# Patient Record
Sex: MALE | Race: WHITE
[De-identification: names, ages, dates, MRNs, and addresses within clinical notes are randomized per-mention and may not be internally consistent; named-entity substitution may affect disease eponyms.]

---

## 2017-04-19 ENCOUNTER — HOSPITAL ENCOUNTER (OUTPATIENT)
Dept: HOSPITAL 19 - COL.RAD | Age: 47
End: 2017-04-19
Attending: UROLOGY
Payer: COMMERCIAL

## 2017-04-19 DIAGNOSIS — N20.0: Primary | ICD-10-CM

## 2017-04-19 DIAGNOSIS — N28.89: ICD-10-CM

## 2020-02-10 ENCOUNTER — HOSPITAL ENCOUNTER (OUTPATIENT)
Dept: HOSPITAL 19 - EUO | Age: 50
Discharge: HOME | End: 2020-02-10
Payer: COMMERCIAL

## 2020-02-10 VITALS — HEART RATE: 88 BPM | DIASTOLIC BLOOD PRESSURE: 72 MMHG | TEMPERATURE: 97.3 F | SYSTOLIC BLOOD PRESSURE: 148 MMHG

## 2020-02-10 VITALS — WEIGHT: 188.5 LBS | BODY MASS INDEX: 27.92 KG/M2 | HEIGHT: 69.02 IN

## 2020-02-10 DIAGNOSIS — L40.50: Primary | ICD-10-CM

## 2020-04-16 ENCOUNTER — HOSPITAL ENCOUNTER (OUTPATIENT)
Dept: HOSPITAL 19 - EUO | Age: 50
Discharge: HOME | End: 2020-04-16
Attending: FAMILY MEDICINE
Payer: COMMERCIAL

## 2020-04-16 VITALS — DIASTOLIC BLOOD PRESSURE: 102 MMHG | TEMPERATURE: 98.7 F | HEART RATE: 70 BPM | SYSTOLIC BLOOD PRESSURE: 132 MMHG

## 2020-04-16 VITALS — WEIGHT: 185.19 LBS | BODY MASS INDEX: 27.43 KG/M2 | HEIGHT: 69.02 IN

## 2020-04-16 DIAGNOSIS — Z79.2: ICD-10-CM

## 2020-04-16 DIAGNOSIS — B95.61: ICD-10-CM

## 2020-04-16 DIAGNOSIS — M00.042: Primary | ICD-10-CM

## 2020-04-16 NOTE — NUR
Pt arrived for evaluation of PICC.Per nurse pt reports PICC not flushing.Pt
arrives with extension on end of PICC line,no PICC cap in place.Extension
removed,PICC cap apllied and PICC flushes with blood return observed.Pt
discharge after apt.

## 2020-04-16 NOTE — NUR
Here for PICC evaluation. According to the patient, he was unable to flush
PICC today. PICC intact right upper arm with no cap on extension set.
Extension set removed. cap applied to end of PICC and flushed with 20ml normal
saline without difficulty with blood return noted. new extension set applied.
cap applied to end of extension set and flushed with 20ml normal saline
without difficulty or resistance. noted catheter out to at least 10cm. patient
reported it has migrated out with dressing changes. his PICC cares are being
provided at [facility]. They are aware of insertion site. will defer to them.
patient to ask about this with his appointment on Monday. voiced understanding
of instructions.

## 2021-10-22 ENCOUNTER — HOSPITAL ENCOUNTER (INPATIENT)
Dept: HOSPITAL 19 - COL.ER | Age: 51
LOS: 4 days | Discharge: HOME | DRG: 177 | End: 2021-10-26
Attending: HOSPITALIST | Admitting: HOSPITALIST
Payer: COMMERCIAL

## 2021-10-22 VITALS — HEIGHT: 69.02 IN | WEIGHT: 189.6 LBS | BODY MASS INDEX: 28.08 KG/M2

## 2021-10-22 VITALS — SYSTOLIC BLOOD PRESSURE: 124 MMHG | TEMPERATURE: 97.9 F | HEART RATE: 85 BPM | DIASTOLIC BLOOD PRESSURE: 86 MMHG

## 2021-10-22 VITALS — DIASTOLIC BLOOD PRESSURE: 95 MMHG | HEART RATE: 94 BPM | SYSTOLIC BLOOD PRESSURE: 121 MMHG | TEMPERATURE: 98.3 F

## 2021-10-22 DIAGNOSIS — F32.A: ICD-10-CM

## 2021-10-22 DIAGNOSIS — J96.01: ICD-10-CM

## 2021-10-22 DIAGNOSIS — F41.9: ICD-10-CM

## 2021-10-22 DIAGNOSIS — N17.9: ICD-10-CM

## 2021-10-22 DIAGNOSIS — M51.36: ICD-10-CM

## 2021-10-22 DIAGNOSIS — L40.50: ICD-10-CM

## 2021-10-22 DIAGNOSIS — G56.02: ICD-10-CM

## 2021-10-22 DIAGNOSIS — T50.995A: ICD-10-CM

## 2021-10-22 DIAGNOSIS — Z73.0: ICD-10-CM

## 2021-10-22 DIAGNOSIS — M50.30: ICD-10-CM

## 2021-10-22 DIAGNOSIS — I12.9: ICD-10-CM

## 2021-10-22 DIAGNOSIS — G89.29: ICD-10-CM

## 2021-10-22 DIAGNOSIS — E83.52: ICD-10-CM

## 2021-10-22 DIAGNOSIS — D64.9: ICD-10-CM

## 2021-10-22 DIAGNOSIS — U07.1: Primary | ICD-10-CM

## 2021-10-22 DIAGNOSIS — N18.9: ICD-10-CM

## 2021-10-22 DIAGNOSIS — Z96.651: ICD-10-CM

## 2021-10-22 DIAGNOSIS — Z79.52: ICD-10-CM

## 2021-10-22 DIAGNOSIS — R04.2: ICD-10-CM

## 2021-10-22 DIAGNOSIS — J12.82: ICD-10-CM

## 2021-10-22 DIAGNOSIS — D84.821: ICD-10-CM

## 2021-10-22 DIAGNOSIS — I21.A1: ICD-10-CM

## 2021-10-22 LAB
ALBUMIN SERPL-MCNC: 3 GM/DL (ref 3.5–5)
ALP SERPL-CCNC: 58 U/L (ref 40–150)
ALT SERPL-CCNC: 23 U/L (ref 0–55)
ANION GAP SERPL CALC-SCNC: 10 MMOL/L (ref 7–16)
AST SERPL-CCNC: 21 U/L (ref 5–34)
BASE EXCESS BLDA CALC-SCNC: -1.1 MMOL/L (ref -2–2)
BASOPHILS # BLD: 0 K/MM3 (ref 0–0.2)
BASOPHILS NFR BLD AUTO: 0.3 % (ref 0–2)
BILIRUB SERPL-MCNC: 0.3 MG/DL (ref 0.2–1.2)
BUN SERPL-MCNC: 32 MG/DL (ref 9–21)
CALCIUM SERPL-MCNC: 9.6 MG/DL (ref 8.4–10.2)
CHLORIDE SERPL-SCNC: 100 MMOL/L (ref 98–107)
CO2 BLDA-SCNC: 23.7 MMOL/L
CO2 SERPL-SCNC: 27 MMOL/L (ref 22–29)
CREAT SERPL-SCNC: 1.12 MG/DL (ref 0.72–1.25)
CRP SERPL-MCNC: 26.64 MG/DL (ref 0–0.5)
EOSINOPHIL # BLD: 0 K/MM3 (ref 0–0.7)
EOSINOPHIL NFR BLD: 0.1 % (ref 0–4)
ERYTHROCYTE [DISTWIDTH] IN BLOOD BY AUTOMATED COUNT: 16.2 % (ref 11.5–14.5)
GLUCOSE SERPL-MCNC: 72 MG/DL (ref 70–99)
GRANULOCYTES # BLD AUTO: 87.7 % (ref 42.2–75.2)
HCO3 BLDA-SCNC: 22.6 MEQ/L (ref 22–26)
HCT VFR BLD AUTO: 31.5 % (ref 42–52)
HGB BLD-MCNC: 10.3 G/DL (ref 13.5–18)
INR BLD: 1.2 (ref 0.8–3)
LYMPHOCYTES # BLD: 0.5 K/MM3 (ref 1.2–3.4)
LYMPHOCYTES NFR BLD: 8 % (ref 20–51)
MAGNESIUM SERPL-MCNC: 1.9 MG/DL (ref 1.6–2.6)
MCH RBC QN AUTO: 30 PG (ref 27–31)
MCHC RBC AUTO-ENTMCNC: 33 G/DL (ref 33–37)
MCV RBC AUTO: 92 FL (ref 80–100)
MONOCYTES # BLD: 0.2 K/MM3 (ref 0.1–0.6)
MONOCYTES NFR BLD AUTO: 3.2 % (ref 1.7–9.3)
NEUTROPHILS # BLD: 5.9 K/MM3 (ref 1.4–6.5)
PCO2 BLDA: 34.2 MMHG (ref 35–45)
PLATELET # BLD AUTO: 215 K/MM3 (ref 130–400)
PMV BLD AUTO: 8.3 FL (ref 7.4–10.4)
PO2 BLDA: 67.4 MMHG (ref 80–100)
POTASSIUM SERPL-SCNC: 4.3 MMOL/L (ref 3.5–4.5)
PROT SERPL-MCNC: 6.5 GM/DL (ref 6.2–8.1)
PROTHROMBIN TIME: 13.3 SECONDS (ref 9.7–12.8)
RBC # BLD AUTO: 3.41 M/MM3 (ref 4.2–5.6)
SAO2 % BLDA: 93 % (ref 92–100)
SODIUM SERPL-SCNC: 137 MMOL/L (ref 136–145)
TROPONIN I SERPL-MCNC: 0.08 NG/ML (ref 0–0.03)
URN COLLECT METHOD CLASS: (no result)

## 2021-10-22 PROCEDURE — XW033E5 INTRODUCTION OF REMDESIVIR ANTI-INFECTIVE INTO PERIPHERAL VEIN, PERCUTANEOUS APPROACH, NEW TECHNOLOGY GROUP 5: ICD-10-PCS | Performed by: HOSPITALIST

## 2021-10-22 NOTE — NUR
Call placed to Sarah Lobo re: critical troponin level, no new orders at
this time. Continue to monitor.

## 2021-10-22 NOTE — NUR
Arrived to unit alert, oriented x 4, able to verbalize needs, shortness of
breath with ambulation, O2@ 8L per oxymask, telemetry in use, no c/o at this
time,

## 2021-10-23 VITALS — SYSTOLIC BLOOD PRESSURE: 128 MMHG | TEMPERATURE: 98.4 F | HEART RATE: 81 BPM | DIASTOLIC BLOOD PRESSURE: 91 MMHG

## 2021-10-23 VITALS — HEART RATE: 73 BPM | TEMPERATURE: 97.6 F | DIASTOLIC BLOOD PRESSURE: 81 MMHG | SYSTOLIC BLOOD PRESSURE: 116 MMHG

## 2021-10-23 VITALS — DIASTOLIC BLOOD PRESSURE: 68 MMHG | SYSTOLIC BLOOD PRESSURE: 129 MMHG | HEART RATE: 76 BPM | TEMPERATURE: 97.7 F

## 2021-10-23 VITALS — TEMPERATURE: 98.1 F | SYSTOLIC BLOOD PRESSURE: 112 MMHG | HEART RATE: 69 BPM | DIASTOLIC BLOOD PRESSURE: 79 MMHG

## 2021-10-23 VITALS — DIASTOLIC BLOOD PRESSURE: 84 MMHG | HEART RATE: 81 BPM | TEMPERATURE: 98 F | SYSTOLIC BLOOD PRESSURE: 121 MMHG

## 2021-10-23 VITALS — HEART RATE: 77 BPM | DIASTOLIC BLOOD PRESSURE: 86 MMHG | TEMPERATURE: 98.6 F | SYSTOLIC BLOOD PRESSURE: 123 MMHG

## 2021-10-23 LAB
ALBUMIN SERPL-MCNC: 2.8 GM/DL (ref 3.5–5)
ALP SERPL-CCNC: 58 U/L (ref 40–150)
ALT SERPL-CCNC: 23 U/L (ref 0–55)
ANION GAP SERPL CALC-SCNC: 14 MMOL/L (ref 7–16)
ANISOCYTOSIS BLD QL: (no result)
AST SERPL-CCNC: 20 U/L (ref 5–34)
BILIRUB SERPL-MCNC: 0.2 MG/DL (ref 0.2–1.2)
BUN SERPL-MCNC: 24 MG/DL (ref 9–21)
CALCIUM SERPL-MCNC: 9.5 MG/DL (ref 8.4–10.2)
CHLORIDE SERPL-SCNC: 101 MMOL/L (ref 98–107)
CO2 SERPL-SCNC: 22 MMOL/L (ref 22–29)
CREAT SERPL-SCNC: 0.89 MG/DL (ref 0.72–1.25)
ERYTHROCYTE [DISTWIDTH] IN BLOOD BY AUTOMATED COUNT: 16.2 % (ref 11.5–14.5)
GLUCOSE SERPL-MCNC: 96 MG/DL (ref 70–99)
HCT VFR BLD AUTO: 33.6 % (ref 42–52)
HGB BLD-MCNC: 10.7 G/DL (ref 13.5–18)
HYPOCHROMIA BLD QL SMEAR: (no result)
LYMPHOCYTES NFR BLD MANUAL: 3 % (ref 20–51)
MCH RBC QN AUTO: 30 PG (ref 27–31)
MCHC RBC AUTO-ENTMCNC: 32 G/DL (ref 33–37)
MCV RBC AUTO: 94 FL (ref 80–100)
MONOCYTES NFR BLD: 3 % (ref 1.7–9.3)
NEUTS SEG NFR BLD MANUAL: 94 % (ref 42–75.2)
PH UR STRIP.AUTO: 5 [PH] (ref 5–8)
PLATELET # BLD AUTO: 264 K/MM3 (ref 130–400)
PLATELET BLD QL SMEAR: NORMAL
PMV BLD AUTO: 8.9 FL (ref 7.4–10.4)
POTASSIUM SERPL-SCNC: 4.5 MMOL/L (ref 3.5–4.5)
PROT SERPL-MCNC: 6.6 GM/DL (ref 6.2–8.1)
RBC # BLD AUTO: 3.58 M/MM3 (ref 4.2–5.6)
RBC # UR: (no result) /HPF
SODIUM SERPL-SCNC: 137 MMOL/L (ref 136–145)
SP GR UR STRIP.AUTO: 1.02 (ref 1–1.03)

## 2021-10-23 NOTE — NUR
SW attempted to call patient to complete intake as well as spouse Michelle
[phone] unable to reach to complete intake. SW will continue to follow.

## 2021-10-23 NOTE — NUR
Patient is requesting PRN ativan for his anxiety. This RN will be handing the
patient off to ELDA Pavon.

## 2021-10-23 NOTE — NUR
Patient laying in bed upon entering the room. He is currently on 6L of O@ via
OM. Patient is tolerating this amount of oxygen well. Patient did have a cup
beside his bed with bloody sputum in it, sputum sample has already been
collected. Patient does not have any concerns at this time.

## 2021-10-24 VITALS — DIASTOLIC BLOOD PRESSURE: 83 MMHG | SYSTOLIC BLOOD PRESSURE: 119 MMHG | HEART RATE: 82 BPM | TEMPERATURE: 98.1 F

## 2021-10-24 VITALS — TEMPERATURE: 98.2 F | HEART RATE: 66 BPM | SYSTOLIC BLOOD PRESSURE: 121 MMHG | DIASTOLIC BLOOD PRESSURE: 88 MMHG

## 2021-10-24 VITALS — SYSTOLIC BLOOD PRESSURE: 123 MMHG | TEMPERATURE: 97.3 F | HEART RATE: 70 BPM | DIASTOLIC BLOOD PRESSURE: 95 MMHG

## 2021-10-24 VITALS — SYSTOLIC BLOOD PRESSURE: 135 MMHG | DIASTOLIC BLOOD PRESSURE: 89 MMHG | TEMPERATURE: 97.9 F | HEART RATE: 79 BPM

## 2021-10-24 LAB
ALBUMIN SERPL-MCNC: 2.8 GM/DL (ref 3.5–5)
ALP SERPL-CCNC: 67 U/L (ref 40–150)
ALT SERPL-CCNC: 23 U/L (ref 0–55)
ANION GAP SERPL CALC-SCNC: 13 MMOL/L (ref 7–16)
AST SERPL-CCNC: 19 U/L (ref 5–34)
BASOPHILS # BLD: 0 K/MM3 (ref 0–0.2)
BASOPHILS NFR BLD AUTO: 0 % (ref 0–2)
BILIRUB SERPL-MCNC: 0.2 MG/DL (ref 0.2–1.2)
BUN SERPL-MCNC: 32 MG/DL (ref 9–21)
CALCIUM SERPL-MCNC: 9.9 MG/DL (ref 8.4–10.2)
CHLORIDE SERPL-SCNC: 99 MMOL/L (ref 98–107)
CO2 SERPL-SCNC: 25 MMOL/L (ref 22–29)
CREAT SERPL-SCNC: 0.93 MG/DL (ref 0.72–1.25)
EOSINOPHIL # BLD: 0 K/MM3 (ref 0–0.7)
EOSINOPHIL NFR BLD: 0 % (ref 0–4)
ERYTHROCYTE [DISTWIDTH] IN BLOOD BY AUTOMATED COUNT: 16.1 % (ref 11.5–14.5)
GLUCOSE SERPL-MCNC: 150 MG/DL (ref 70–99)
GRANULOCYTES # BLD AUTO: 89 % (ref 42.2–75.2)
HCT VFR BLD AUTO: 35.2 % (ref 42–52)
HGB BLD-MCNC: 11.2 G/DL (ref 13.5–18)
LYMPHOCYTES # BLD: 0.3 K/MM3 (ref 1.2–3.4)
LYMPHOCYTES NFR BLD: 5.3 % (ref 20–51)
MCH RBC QN AUTO: 29 PG (ref 27–31)
MCHC RBC AUTO-ENTMCNC: 32 G/DL (ref 33–37)
MCV RBC AUTO: 91 FL (ref 80–100)
MONOCYTES # BLD: 0.3 K/MM3 (ref 0.1–0.6)
MONOCYTES NFR BLD AUTO: 4.7 % (ref 1.7–9.3)
NEUTROPHILS # BLD: 5.4 K/MM3 (ref 1.4–6.5)
PLATELET # BLD AUTO: 306 K/MM3 (ref 130–400)
PMV BLD AUTO: 9.1 FL (ref 7.4–10.4)
POTASSIUM SERPL-SCNC: 4.4 MMOL/L (ref 3.5–4.5)
PROT SERPL-MCNC: 6.7 GM/DL (ref 6.2–8.1)
RBC # BLD AUTO: 3.87 M/MM3 (ref 4.2–5.6)
SODIUM SERPL-SCNC: 137 MMOL/L (ref 136–145)

## 2021-10-24 NOTE — NUR
Patient exiting the bathroom upon entering the room. Patient was using his 4WW
and was stable, gait was steady. Shift assessment completed and morning
medications administered. Patient does not have any concerns at this time.

## 2021-10-24 NOTE — NUR
Patient has done well today. Patient is currently on 4L O2 via OM and
tolerating it well. IV in the right AC was a little leaky, this RN did a
dressing change on the IV and the leaking has ceased. Patient has not had any
complaints or concerns today and overall feels a bit better than he had.

## 2021-10-25 VITALS — SYSTOLIC BLOOD PRESSURE: 130 MMHG | DIASTOLIC BLOOD PRESSURE: 70 MMHG | TEMPERATURE: 98.3 F | HEART RATE: 79 BPM

## 2021-10-25 VITALS — HEART RATE: 69 BPM | SYSTOLIC BLOOD PRESSURE: 139 MMHG | DIASTOLIC BLOOD PRESSURE: 88 MMHG | TEMPERATURE: 98.1 F

## 2021-10-25 VITALS — HEART RATE: 71 BPM | SYSTOLIC BLOOD PRESSURE: 128 MMHG | DIASTOLIC BLOOD PRESSURE: 99 MMHG | TEMPERATURE: 97.7 F

## 2021-10-25 VITALS — SYSTOLIC BLOOD PRESSURE: 124 MMHG | TEMPERATURE: 97.7 F | DIASTOLIC BLOOD PRESSURE: 96 MMHG | HEART RATE: 94 BPM

## 2021-10-25 VITALS — TEMPERATURE: 98.2 F | HEART RATE: 69 BPM | SYSTOLIC BLOOD PRESSURE: 106 MMHG | DIASTOLIC BLOOD PRESSURE: 82 MMHG

## 2021-10-25 VITALS — DIASTOLIC BLOOD PRESSURE: 74 MMHG | TEMPERATURE: 98.6 F | SYSTOLIC BLOOD PRESSURE: 124 MMHG | HEART RATE: 71 BPM

## 2021-10-25 VITALS — HEART RATE: 66 BPM | SYSTOLIC BLOOD PRESSURE: 119 MMHG | TEMPERATURE: 97.9 F | DIASTOLIC BLOOD PRESSURE: 87 MMHG

## 2021-10-25 LAB
ALBUMIN SERPL-MCNC: 2.8 GM/DL (ref 3.5–5)
ALP SERPL-CCNC: 65 U/L (ref 40–150)
ALT SERPL-CCNC: 30 U/L (ref 0–55)
ANION GAP SERPL CALC-SCNC: 14 MMOL/L (ref 7–16)
AST SERPL-CCNC: 22 U/L (ref 5–34)
BASOPHILS # BLD: 0 K/MM3 (ref 0–0.2)
BASOPHILS NFR BLD AUTO: 0.1 % (ref 0–2)
BILIRUB SERPL-MCNC: 0.3 MG/DL (ref 0.2–1.2)
BUN SERPL-MCNC: 38 MG/DL (ref 9–21)
CALCIUM SERPL-MCNC: 10.1 MG/DL (ref 8.4–10.2)
CHLORIDE SERPL-SCNC: 99 MMOL/L (ref 98–107)
CO2 SERPL-SCNC: 24 MMOL/L (ref 22–29)
CREAT SERPL-SCNC: 1.04 MG/DL (ref 0.72–1.25)
EOSINOPHIL # BLD: 0 K/MM3 (ref 0–0.7)
EOSINOPHIL NFR BLD: 0 % (ref 0–4)
ERYTHROCYTE [DISTWIDTH] IN BLOOD BY AUTOMATED COUNT: 15.9 % (ref 11.5–14.5)
GLUCOSE SERPL-MCNC: 127 MG/DL (ref 70–99)
GRANULOCYTES # BLD AUTO: 87.4 % (ref 42.2–75.2)
HCT VFR BLD AUTO: 35.4 % (ref 42–52)
HGB BLD-MCNC: 11.6 G/DL (ref 13.5–18)
LYMPHOCYTES # BLD: 0.4 K/MM3 (ref 1.2–3.4)
LYMPHOCYTES NFR BLD: 4.6 % (ref 20–51)
MCH RBC QN AUTO: 30 PG (ref 27–31)
MCHC RBC AUTO-ENTMCNC: 33 G/DL (ref 33–37)
MCV RBC AUTO: 91 FL (ref 80–100)
MONOCYTES # BLD: 0.6 K/MM3 (ref 0.1–0.6)
MONOCYTES NFR BLD AUTO: 6.7 % (ref 1.7–9.3)
NEUTROPHILS # BLD: 7.6 K/MM3 (ref 1.4–6.5)
PLATELET # BLD AUTO: 348 K/MM3 (ref 130–400)
PMV BLD AUTO: 9.1 FL (ref 7.4–10.4)
POTASSIUM SERPL-SCNC: 4.2 MMOL/L (ref 3.5–4.5)
PROT SERPL-MCNC: 6.4 GM/DL (ref 6.2–8.1)
RBC # BLD AUTO: 3.9 M/MM3 (ref 4.2–5.6)
SODIUM SERPL-SCNC: 137 MMOL/L (ref 136–145)

## 2021-10-25 NOTE — NUR
SW contacted the patient to discuss discharge plan. The patient lives in
[place] with his wife, Michelle (ph#[phone]), and their children. He
reports independence with ADLs and has a walker. The patient's PCP is Dr. Hoang Burton and he receives his medications from [facility]. He
reports no difficulties obtaining his meds. The patient does not have a
DPOA-HC and he was not interested in completing one at this time. The patient
plans to return home with his family upon discharge. He is currently requiring
4 liters of oxygen. SW to continue to monitor.
 
*Discharge plan: home with family*

## 2021-10-25 NOTE — NUR
Patient sleeping upon entering the room. Requested to use the bathroom before
receiving his IV medications. Patient ambulated to the bathroom w/ his 4WW.
Patient is stable with a steady gait. Patient informed this nurse that last
night when his IV was running, he had some burning and leaking from the IV
site. New IV to be started.

## 2021-10-25 NOTE — NUR
Patient has been doing well today. Has not had any complaints. Remains on 3L
O2 via NC, tolerating it well.

## 2021-10-25 NOTE — NUR
Rested quietly throughout night, increased O2 to 7L over night shift to meet
oxygen demand to maintain SPO2, VS stable, tolerating covid medication regimen
w/o difficulty, ambulates with walker in room.

## 2021-10-26 VITALS — SYSTOLIC BLOOD PRESSURE: 125 MMHG | TEMPERATURE: 98.6 F | HEART RATE: 77 BPM | DIASTOLIC BLOOD PRESSURE: 96 MMHG

## 2021-10-26 VITALS — SYSTOLIC BLOOD PRESSURE: 132 MMHG | TEMPERATURE: 98.4 F | HEART RATE: 68 BPM | DIASTOLIC BLOOD PRESSURE: 93 MMHG

## 2021-10-26 VITALS — DIASTOLIC BLOOD PRESSURE: 79 MMHG | TEMPERATURE: 98.6 F | HEART RATE: 75 BPM | SYSTOLIC BLOOD PRESSURE: 107 MMHG

## 2021-10-26 LAB
ALBUMIN SERPL-MCNC: 2.7 GM/DL (ref 3.5–5)
ALP SERPL-CCNC: 67 U/L (ref 40–150)
ALT SERPL-CCNC: 38 U/L (ref 0–55)
ANION GAP SERPL CALC-SCNC: 11 MMOL/L (ref 7–16)
ANISOCYTOSIS BLD QL: (no result)
AST SERPL-CCNC: 23 U/L (ref 5–34)
BILIRUB SERPL-MCNC: 0.3 MG/DL (ref 0.2–1.2)
BUN SERPL-MCNC: 38 MG/DL (ref 9–21)
CALCIUM SERPL-MCNC: 9.6 MG/DL (ref 8.4–10.2)
CHLORIDE SERPL-SCNC: 101 MMOL/L (ref 98–107)
CO2 SERPL-SCNC: 25 MMOL/L (ref 22–29)
CREAT SERPL-SCNC: 0.87 MG/DL (ref 0.72–1.25)
CRP SERPL-MCNC: 5.99 MG/DL (ref 0–0.5)
ERYTHROCYTE [DISTWIDTH] IN BLOOD BY AUTOMATED COUNT: 15.6 % (ref 11.5–14.5)
GLUCOSE SERPL-MCNC: 85 MG/DL (ref 70–99)
HCT VFR BLD AUTO: 34.1 % (ref 42–52)
HGB BLD-MCNC: 11.3 G/DL (ref 13.5–18)
LYMPHOCYTES NFR BLD MANUAL: 11 % (ref 20–51)
MCH RBC QN AUTO: 30 PG (ref 27–31)
MCHC RBC AUTO-ENTMCNC: 33 G/DL (ref 33–37)
MCV RBC AUTO: 90 FL (ref 80–100)
MONOCYTES NFR BLD: 12 % (ref 1.7–9.3)
NEUTS SEG NFR BLD MANUAL: 77 % (ref 42–75.2)
PLATELET # BLD AUTO: 328 K/MM3 (ref 130–400)
PLATELET BLD QL SMEAR: NORMAL
PMV BLD AUTO: 8.7 FL (ref 7.4–10.4)
POTASSIUM SERPL-SCNC: 4 MMOL/L (ref 3.5–4.5)
PROT SERPL-MCNC: 6.2 GM/DL (ref 6.2–8.1)
RBC # BLD AUTO: 3.78 M/MM3 (ref 4.2–5.6)
SODIUM SERPL-SCNC: 137 MMOL/L (ref 136–145)

## 2021-10-26 NOTE — NUR
PT DENIES ANY COMPLAINTS, ALERT AND ORIENTED X 4, LEFT FOREARM IV WAS BURNING
AND HURTING DURING FLUSH WITH INABILITY TO FLUSH; STARTED NEW IV IN LEFT AC
AND FLUSHED WITH NO COMPLICATIONS. NO FURTHER CONCERNS.

## 2021-10-26 NOTE — NUR
The patient qualified for 3 liters of oxygen. MARGA notified the patient. He
lives in [place] with his wife. MARGA informed him of the DME company in [place]. The patient was open to getting the oxygen from [facility] and he
requested that SW contact his wife.
 
MARGA then contacted the patient's wife, Michelle, to update. Michelle would
prefer getting the oxygen from [facility] as well. She states that she
can  the oxygen on the way to get the patient. MARGA contacted and faxed
the oxygen order to [facility]. The representative reports that they
will contact the patient's wife, once the order is ready. MARGA updated Michelle
and she was in agreement to the plan. MARGA updated the patient's RN.
 
The patient is to discharge back home with his wife today, 10/26. No
additional needs at this time.

## 2022-05-03 ENCOUNTER — HOSPITAL ENCOUNTER (INPATIENT)
Dept: HOSPITAL 19 - COL.ER | Age: 52
LOS: 2 days | Discharge: HOME | DRG: 379 | End: 2022-05-05
Attending: STUDENT IN AN ORGANIZED HEALTH CARE EDUCATION/TRAINING PROGRAM | Admitting: STUDENT IN AN ORGANIZED HEALTH CARE EDUCATION/TRAINING PROGRAM
Payer: COMMERCIAL

## 2022-05-03 VITALS — SYSTOLIC BLOOD PRESSURE: 103 MMHG | HEART RATE: 77 BPM | DIASTOLIC BLOOD PRESSURE: 72 MMHG | TEMPERATURE: 98.3 F

## 2022-05-03 VITALS — TEMPERATURE: 97.9 F | DIASTOLIC BLOOD PRESSURE: 76 MMHG | SYSTOLIC BLOOD PRESSURE: 118 MMHG | HEART RATE: 78 BPM

## 2022-05-03 VITALS — HEART RATE: 85 BPM | DIASTOLIC BLOOD PRESSURE: 58 MMHG | SYSTOLIC BLOOD PRESSURE: 114 MMHG | TEMPERATURE: 97.9 F

## 2022-05-03 VITALS — SYSTOLIC BLOOD PRESSURE: 111 MMHG | HEART RATE: 65 BPM | TEMPERATURE: 97.9 F | DIASTOLIC BLOOD PRESSURE: 78 MMHG

## 2022-05-03 VITALS — SYSTOLIC BLOOD PRESSURE: 109 MMHG | HEART RATE: 98 BPM | TEMPERATURE: 97.6 F | DIASTOLIC BLOOD PRESSURE: 65 MMHG

## 2022-05-03 VITALS — DIASTOLIC BLOOD PRESSURE: 80 MMHG | SYSTOLIC BLOOD PRESSURE: 115 MMHG | TEMPERATURE: 98.3 F | HEART RATE: 87 BPM

## 2022-05-03 VITALS — HEART RATE: 86 BPM | SYSTOLIC BLOOD PRESSURE: 114 MMHG | TEMPERATURE: 97.9 F | DIASTOLIC BLOOD PRESSURE: 73 MMHG

## 2022-05-03 VITALS — SYSTOLIC BLOOD PRESSURE: 111 MMHG | TEMPERATURE: 98.3 F | HEART RATE: 78 BPM | DIASTOLIC BLOOD PRESSURE: 75 MMHG

## 2022-05-03 VITALS — TEMPERATURE: 97.9 F | HEART RATE: 82 BPM | DIASTOLIC BLOOD PRESSURE: 56 MMHG | SYSTOLIC BLOOD PRESSURE: 118 MMHG

## 2022-05-03 VITALS — TEMPERATURE: 98.3 F | HEART RATE: 86 BPM | SYSTOLIC BLOOD PRESSURE: 109 MMHG | DIASTOLIC BLOOD PRESSURE: 91 MMHG

## 2022-05-03 VITALS — TEMPERATURE: 98.2 F | SYSTOLIC BLOOD PRESSURE: 105 MMHG | DIASTOLIC BLOOD PRESSURE: 89 MMHG | HEART RATE: 60 BPM

## 2022-05-03 VITALS — DIASTOLIC BLOOD PRESSURE: 73 MMHG | HEART RATE: 80 BPM | TEMPERATURE: 97.9 F | SYSTOLIC BLOOD PRESSURE: 108 MMHG

## 2022-05-03 VITALS — HEIGHT: 60 IN | WEIGHT: 195.33 LBS

## 2022-05-03 VITALS — HEART RATE: 82 BPM | SYSTOLIC BLOOD PRESSURE: 102 MMHG | TEMPERATURE: 97.9 F | DIASTOLIC BLOOD PRESSURE: 68 MMHG

## 2022-05-03 DIAGNOSIS — G89.29: ICD-10-CM

## 2022-05-03 DIAGNOSIS — Z96.651: ICD-10-CM

## 2022-05-03 DIAGNOSIS — K64.2: ICD-10-CM

## 2022-05-03 DIAGNOSIS — Z23: ICD-10-CM

## 2022-05-03 DIAGNOSIS — D64.9: ICD-10-CM

## 2022-05-03 DIAGNOSIS — Z79.52: ICD-10-CM

## 2022-05-03 DIAGNOSIS — G56.02: ICD-10-CM

## 2022-05-03 DIAGNOSIS — M51.36: ICD-10-CM

## 2022-05-03 DIAGNOSIS — I12.9: ICD-10-CM

## 2022-05-03 DIAGNOSIS — F32.A: ICD-10-CM

## 2022-05-03 DIAGNOSIS — M25.569: ICD-10-CM

## 2022-05-03 DIAGNOSIS — L40.50: ICD-10-CM

## 2022-05-03 DIAGNOSIS — D72.823: ICD-10-CM

## 2022-05-03 DIAGNOSIS — M50.30: ICD-10-CM

## 2022-05-03 DIAGNOSIS — T39.395A: ICD-10-CM

## 2022-05-03 DIAGNOSIS — K57.31: Primary | ICD-10-CM

## 2022-05-03 DIAGNOSIS — N18.2: ICD-10-CM

## 2022-05-03 DIAGNOSIS — K25.9: ICD-10-CM

## 2022-05-03 DIAGNOSIS — M54.9: ICD-10-CM

## 2022-05-03 DIAGNOSIS — K21.9: ICD-10-CM

## 2022-05-03 DIAGNOSIS — F41.9: ICD-10-CM

## 2022-05-03 LAB
ALBUMIN SERPL-MCNC: 3.1 GM/DL (ref 3.5–5)
ALP SERPL-CCNC: 84 U/L (ref 40–150)
ALT SERPL-CCNC: 22 U/L (ref 0–55)
ANION GAP SERPL CALC-SCNC: 13 MMOL/L (ref 7–16)
AST SERPL-CCNC: 14 U/L (ref 5–34)
BASOPHILS # BLD: 0.1 K/MM3 (ref 0–0.2)
BASOPHILS NFR BLD AUTO: 0.4 % (ref 0–2)
BILIRUB SERPL-MCNC: 0.3 MG/DL (ref 0.2–1.2)
BUN SERPL-MCNC: 53 MG/DL (ref 9–21)
CALCIUM SERPL-MCNC: 8.5 MG/DL (ref 8.4–10.2)
CHLORIDE SERPL-SCNC: 105 MMOL/L (ref 98–107)
CO2 SERPL-SCNC: 23 MMOL/L (ref 22–29)
CREAT SERPL-SCNC: 1.17 MG/DL (ref 0.72–1.25)
EOSINOPHIL # BLD: 0.1 K/MM3 (ref 0–0.7)
EOSINOPHIL NFR BLD: 0.9 % (ref 0–4)
ERYTHROCYTE [DISTWIDTH] IN BLOOD BY AUTOMATED COUNT: 16.2 % (ref 11.5–14.5)
GLUCOSE SERPL-MCNC: 130 MG/DL (ref 70–99)
GRANULOCYTES # BLD AUTO: 71.3 % (ref 42.2–75.2)
HCT VFR BLD AUTO: 21.9 % (ref 42–52)
HCT VFR BLD AUTO: 24 % (ref 42–52)
HGB BLD-MCNC: 6.6 G/DL (ref 13.5–18)
HGB BLD-MCNC: 7.7 G/DL (ref 13.5–18)
INR BLD: 1.2 (ref 0.8–3)
LYMPHOCYTES # BLD: 1.7 K/MM3 (ref 1.2–3.4)
LYMPHOCYTES NFR BLD: 15.1 % (ref 20–51)
MCH RBC QN AUTO: 29 PG (ref 27–31)
MCHC RBC AUTO-ENTMCNC: 30 G/DL (ref 33–37)
MCV RBC AUTO: 95 FL (ref 80–100)
MONOCYTES # BLD: 1.3 K/MM3 (ref 0.1–0.6)
MONOCYTES NFR BLD AUTO: 11.3 % (ref 1.7–9.3)
NEUTROPHILS # BLD: 8.2 K/MM3 (ref 1.4–6.5)
PLATELET # BLD AUTO: 331 K/MM3 (ref 130–400)
PMV BLD AUTO: 9.1 FL (ref 7.4–10.4)
POTASSIUM SERPL-SCNC: 4.2 MMOL/L (ref 3.5–4.5)
PROT SERPL-MCNC: 5.6 GM/DL (ref 6.2–8.1)
PROTHROMBIN TIME: 14.3 SECONDS (ref 9.7–12.8)
RBC # BLD AUTO: 2.31 M/MM3 (ref 4.2–5.6)
SODIUM SERPL-SCNC: 141 MMOL/L (ref 136–145)

## 2022-05-03 PROCEDURE — C9113 INJ PANTOPRAZOLE SODIUM, VIA: HCPCS

## 2022-05-03 PROCEDURE — P9016 RBC LEUKOCYTES REDUCED: HCPCS

## 2022-05-03 PROCEDURE — 0DJ08ZZ INSPECTION OF UPPER INTESTINAL TRACT, VIA NATURAL OR ARTIFICIAL OPENING ENDOSCOPIC: ICD-10-PCS | Performed by: INTERNAL MEDICINE

## 2022-05-03 NOTE — NUR
PATIENT ARRIVED TO UNIT IN STABLE CONDITION WITH BLOOD TRANSFUSION RUNNING AT
150ML PER HOUR. NO SIGNS OF REACTION AT THIS TIME. VITALS WNL. WILL TRANSFUSE
SECOND UNIT AS ORDERED.

## 2022-05-03 NOTE — NUR
Patient assessed around 2050. Alert and oriented, and able to make needs
known. Denies having pain and discomfort at this time. Peripheral IV to left
forearm with IV fluids running per orders. INT to right AC. Denies SOB and
dyspnea. LS CTA. HRR. Telemetry in place. BSAx4. Patient tolerating bowel prep
well. Having liquid, dark red bloody stools. No edema. Voices no questions,
needs or concerns at this time. Aware that he is NPO after midnight. In bed
with call light within reach.

## 2022-05-03 NOTE — NUR
Patient's second unit of blood completed prior to this nurse receiving report
around 1830. Spoke with HALIE Escobedo. Ani H&H at 1930. Called and notified
lab.

## 2022-05-04 VITALS — DIASTOLIC BLOOD PRESSURE: 82 MMHG | SYSTOLIC BLOOD PRESSURE: 126 MMHG | HEART RATE: 96 BPM | TEMPERATURE: 98.7 F

## 2022-05-04 VITALS — SYSTOLIC BLOOD PRESSURE: 114 MMHG | HEART RATE: 53 BPM | DIASTOLIC BLOOD PRESSURE: 73 MMHG

## 2022-05-04 VITALS — HEART RATE: 98 BPM | DIASTOLIC BLOOD PRESSURE: 98 MMHG | SYSTOLIC BLOOD PRESSURE: 137 MMHG

## 2022-05-04 VITALS — DIASTOLIC BLOOD PRESSURE: 68 MMHG | SYSTOLIC BLOOD PRESSURE: 127 MMHG | TEMPERATURE: 97.6 F | HEART RATE: 50 BPM

## 2022-05-04 VITALS — SYSTOLIC BLOOD PRESSURE: 126 MMHG | HEART RATE: 100 BPM | DIASTOLIC BLOOD PRESSURE: 82 MMHG

## 2022-05-04 VITALS — HEART RATE: 100 BPM | SYSTOLIC BLOOD PRESSURE: 126 MMHG | DIASTOLIC BLOOD PRESSURE: 82 MMHG

## 2022-05-04 VITALS — TEMPERATURE: 99.1 F | DIASTOLIC BLOOD PRESSURE: 83 MMHG | SYSTOLIC BLOOD PRESSURE: 134 MMHG | HEART RATE: 94 BPM

## 2022-05-04 VITALS — SYSTOLIC BLOOD PRESSURE: 113 MMHG | HEART RATE: 93 BPM | DIASTOLIC BLOOD PRESSURE: 82 MMHG | TEMPERATURE: 97.4 F

## 2022-05-04 VITALS — TEMPERATURE: 98.1 F | HEART RATE: 92 BPM | SYSTOLIC BLOOD PRESSURE: 126 MMHG | DIASTOLIC BLOOD PRESSURE: 92 MMHG

## 2022-05-04 VITALS — HEART RATE: 55 BPM | DIASTOLIC BLOOD PRESSURE: 75 MMHG | SYSTOLIC BLOOD PRESSURE: 124 MMHG

## 2022-05-04 VITALS — HEART RATE: 99 BPM | DIASTOLIC BLOOD PRESSURE: 89 MMHG | SYSTOLIC BLOOD PRESSURE: 129 MMHG

## 2022-05-04 VITALS — DIASTOLIC BLOOD PRESSURE: 71 MMHG | HEART RATE: 100 BPM | SYSTOLIC BLOOD PRESSURE: 115 MMHG

## 2022-05-04 LAB
ANION GAP SERPL CALC-SCNC: 11 MMOL/L (ref 7–16)
BASOPHILS # BLD: 0 K/MM3 (ref 0–0.2)
BASOPHILS NFR BLD AUTO: 0.3 % (ref 0–2)
BUN SERPL-MCNC: 34 MG/DL (ref 9–21)
CALCIUM SERPL-MCNC: 7.8 MG/DL (ref 8.4–10.2)
CHLORIDE SERPL-SCNC: 111 MMOL/L (ref 98–107)
CO2 SERPL-SCNC: 19 MMOL/L (ref 22–29)
CREAT SERPL-SCNC: 0.93 MG/DL (ref 0.72–1.25)
EOSINOPHIL # BLD: 0.1 K/MM3 (ref 0–0.7)
EOSINOPHIL NFR BLD: 0.6 % (ref 0–4)
ERYTHROCYTE [DISTWIDTH] IN BLOOD BY AUTOMATED COUNT: 16.6 % (ref 11.5–14.5)
GLUCOSE SERPL-MCNC: 89 MG/DL (ref 70–99)
GRANULOCYTES # BLD AUTO: 73.6 % (ref 42.2–75.2)
HCT VFR BLD AUTO: 22.4 % (ref 42–52)
HGB BLD-MCNC: 7.2 G/DL (ref 13.5–18)
LYMPHOCYTES # BLD: 1.2 K/MM3 (ref 1.2–3.4)
LYMPHOCYTES NFR BLD: 12.5 % (ref 20–51)
MCH RBC QN AUTO: 29 PG (ref 27–31)
MCHC RBC AUTO-ENTMCNC: 32 G/DL (ref 33–37)
MCV RBC AUTO: 91 FL (ref 80–100)
MONOCYTES # BLD: 1.1 K/MM3 (ref 0.1–0.6)
MONOCYTES NFR BLD AUTO: 11.7 % (ref 1.7–9.3)
NEUTROPHILS # BLD: 7.1 K/MM3 (ref 1.4–6.5)
PLATELET # BLD AUTO: 246 K/MM3 (ref 130–400)
PMV BLD AUTO: 8.9 FL (ref 7.4–10.4)
POTASSIUM SERPL-SCNC: 3.8 MMOL/L (ref 3.5–4.5)
RBC # BLD AUTO: 2.47 M/MM3 (ref 4.2–5.6)
SODIUM SERPL-SCNC: 141 MMOL/L (ref 136–145)

## 2022-05-04 PROCEDURE — 0W3P8ZZ CONTROL BLEEDING IN GASTROINTESTINAL TRACT, VIA NATURAL OR ARTIFICIAL OPENING ENDOSCOPIC: ICD-10-PCS | Performed by: STUDENT IN AN ORGANIZED HEALTH CARE EDUCATION/TRAINING PROGRAM

## 2022-05-04 NOTE — NUR
PT RESTING IN BED. MORNING IV MEDICATIONS GIVEN, PO MEDICATIONS HELD. NPO FOR
COLONOSCOPY. SHIFT ASSESSMENT COMPLETED. PT REPORTS HANDS ARE SWELLING AND
ACHING DUE TO ARTHRITIS. NS INFUSING AT 100ML/HR. PT REPORTS COMPLETING HIS
BOWEL PREP, HASN'T HAD A STOOL THIS AM. UPDATED ON POC. CONTINUING TO MONITOR.

## 2022-05-04 NOTE — NUR
met with patient to discuss discharge plan. Patient currently
lives at home with his wife Michelle ([phone]) in [place]. Patient
reports to being fully independent with his ADL's but does utilize a walker to
assist with mobility due to his psoriatic arthritis pinching nerves in his
back. States he is scheduled to have back surgery in June to help with this.
PCP is Dr. Burton and he utilizes [facility] in [place] for his
perscriptions. Patient has no home oxygen needs. Patient reports that he does
not have a DPOA-HC estbalished and does not wish to create one at this time.
Patient is planning on returning home once medically ready with no concerns.
 
Discharge plan: Home with spouse

## 2022-05-04 NOTE — NUR
Initial shift assessment done-very pleasant, states having some pain to his
joints 4/10, especially hands- will get his Tramadol now, aslo requesting his
Ativan
before bed tonight- will check with Sarah BEASLEY if ok to give dose  before
bed-- Tele on,NSR 88/min, IV fluids of LR at 75cc/hr

## 2022-05-04 NOTE — NUR
IV fluids continue per orders. Patient reports liquid, bloody stools. Reported
level 5 pain to hands this morning, and given PRN Tramadol as requested for
pain. Voices no further questions, needs, or concerns at this time. Completed
bowel prep, and has been NPO since midnight, except took medication with sips
of water. In bed with call light within reach.

## 2022-05-05 VITALS — HEART RATE: 90 BPM | DIASTOLIC BLOOD PRESSURE: 72 MMHG | TEMPERATURE: 97.6 F | SYSTOLIC BLOOD PRESSURE: 108 MMHG

## 2022-05-05 VITALS — TEMPERATURE: 98.3 F | HEART RATE: 98 BPM | SYSTOLIC BLOOD PRESSURE: 126 MMHG | DIASTOLIC BLOOD PRESSURE: 81 MMHG

## 2022-05-05 VITALS — HEART RATE: 99 BPM | DIASTOLIC BLOOD PRESSURE: 78 MMHG | TEMPERATURE: 98.1 F | SYSTOLIC BLOOD PRESSURE: 116 MMHG

## 2022-05-05 VITALS — DIASTOLIC BLOOD PRESSURE: 82 MMHG | HEART RATE: 100 BPM | SYSTOLIC BLOOD PRESSURE: 121 MMHG | TEMPERATURE: 98.4 F

## 2022-05-05 VITALS — HEART RATE: 95 BPM | TEMPERATURE: 98.3 F | SYSTOLIC BLOOD PRESSURE: 119 MMHG | DIASTOLIC BLOOD PRESSURE: 75 MMHG

## 2022-05-05 VITALS — HEART RATE: 102 BPM | SYSTOLIC BLOOD PRESSURE: 129 MMHG | DIASTOLIC BLOOD PRESSURE: 90 MMHG | TEMPERATURE: 98.1 F

## 2022-05-05 VITALS — DIASTOLIC BLOOD PRESSURE: 89 MMHG | TEMPERATURE: 98.3 F | HEART RATE: 115 BPM | SYSTOLIC BLOOD PRESSURE: 121 MMHG

## 2022-05-05 VITALS — HEART RATE: 100 BPM | SYSTOLIC BLOOD PRESSURE: 117 MMHG | TEMPERATURE: 97.9 F | DIASTOLIC BLOOD PRESSURE: 84 MMHG

## 2022-05-05 VITALS — HEART RATE: 92 BPM | SYSTOLIC BLOOD PRESSURE: 114 MMHG | DIASTOLIC BLOOD PRESSURE: 81 MMHG | TEMPERATURE: 98.5 F

## 2022-05-05 LAB
ANION GAP SERPL CALC-SCNC: 12 MMOL/L (ref 7–16)
BASOPHILS # BLD: 0 K/MM3 (ref 0–0.2)
BASOPHILS # BLD: 0 K/MM3 (ref 0–0.2)
BASOPHILS NFR BLD AUTO: 0.3 % (ref 0–2)
BASOPHILS NFR BLD AUTO: 0.3 % (ref 0–2)
BUN SERPL-MCNC: 19 MG/DL (ref 9–21)
CALCIUM SERPL-MCNC: 8.2 MG/DL (ref 8.4–10.2)
CHLORIDE SERPL-SCNC: 108 MMOL/L (ref 98–107)
CO2 SERPL-SCNC: 21 MMOL/L (ref 22–29)
CREAT SERPL-SCNC: 0.89 MG/DL (ref 0.72–1.25)
EOSINOPHIL # BLD: 0 K/MM3 (ref 0–0.7)
EOSINOPHIL # BLD: 0.1 K/MM3 (ref 0–0.7)
EOSINOPHIL NFR BLD: 0.3 % (ref 0–4)
EOSINOPHIL NFR BLD: 0.5 % (ref 0–4)
ERYTHROCYTE [DISTWIDTH] IN BLOOD BY AUTOMATED COUNT: 16.3 % (ref 11.5–14.5)
ERYTHROCYTE [DISTWIDTH] IN BLOOD BY AUTOMATED COUNT: 17.2 % (ref 11.5–14.5)
GLUCOSE SERPL-MCNC: 82 MG/DL (ref 70–99)
GRANULOCYTES # BLD AUTO: 74.4 % (ref 42.2–75.2)
GRANULOCYTES # BLD AUTO: 80.6 % (ref 42.2–75.2)
HCT VFR BLD AUTO: 21.9 % (ref 42–52)
HCT VFR BLD AUTO: 25.8 % (ref 42–52)
HGB BLD-MCNC: 6.6 G/DL (ref 13.5–18)
HGB BLD-MCNC: 8.4 G/DL (ref 13.5–18)
LYMPHOCYTES # BLD: 0.9 K/MM3 (ref 1.2–3.4)
LYMPHOCYTES # BLD: 1.1 K/MM3 (ref 1.2–3.4)
LYMPHOCYTES NFR BLD: 11.7 % (ref 20–51)
LYMPHOCYTES NFR BLD: 7.2 % (ref 20–51)
MCH RBC QN AUTO: 29 PG (ref 27–31)
MCH RBC QN AUTO: 30 PG (ref 27–31)
MCHC RBC AUTO-ENTMCNC: 30 G/DL (ref 33–37)
MCHC RBC AUTO-ENTMCNC: 33 G/DL (ref 33–37)
MCV RBC AUTO: 91 FL (ref 80–100)
MCV RBC AUTO: 98 FL (ref 80–100)
MONOCYTES # BLD: 1.2 K/MM3 (ref 0.1–0.6)
MONOCYTES # BLD: 1.4 K/MM3 (ref 0.1–0.6)
MONOCYTES NFR BLD AUTO: 10.5 % (ref 1.7–9.3)
MONOCYTES NFR BLD AUTO: 11.9 % (ref 1.7–9.3)
NEUTROPHILS # BLD: 10.5 K/MM3 (ref 1.4–6.5)
NEUTROPHILS # BLD: 7.2 K/MM3 (ref 1.4–6.5)
PLATELET # BLD AUTO: 253 K/MM3 (ref 130–400)
PLATELET # BLD AUTO: 260 K/MM3 (ref 130–400)
PMV BLD AUTO: 8.7 FL (ref 7.4–10.4)
PMV BLD AUTO: 9 FL (ref 7.4–10.4)
POTASSIUM SERPL-SCNC: 3.5 MMOL/L (ref 3.5–4.5)
RBC # BLD AUTO: 2.24 M/MM3 (ref 4.2–5.6)
RBC # BLD AUTO: 2.84 M/MM3 (ref 4.2–5.6)
SODIUM SERPL-SCNC: 141 MMOL/L (ref 136–145)

## 2022-05-05 NOTE — NUR
PT RESTING QUIETLY IN BED. PT STATES THAT HE IS HAVING SOME PAIN IN HIS HANDS
AND JOINTS SCHEDULED PAIN MEDS WERE GIVEN. PT HEMOGLOBIN 6.6 PT IS RECEIVING 1
UNIT OF PRBC CURRENTLY, TOLERATING WELL. PT ORDER BREAKFAST HIMSELF. PT HAS NO
COMPLAINTS OR NEEDS AT THIS TIME. CALL LIGHT IS WITHIN REACH.

## 2022-05-05 NOTE — NUR
SPOKE TO BLOOD BANK ABOUT ORDER FOR PRB. BLOOD CHEN CONFIRMS ODER RECEIVED
AND STATES THAT IT WILL BE READY IN ABOUT 5 MINS.

## 2022-05-05 NOTE — NUR
INFORMED PT OF HGB 8.4 AND THAT DISCHARGE ORDERS ARE IN AND THAT PT IS ABLE TO
GO HOME SINCE HIS HGB CAME UP. PT STATES THAT HE WILL CALL HIS WIFE AND HAVE
HER COME GET HIM. INFORMED PT TO LET US KNOW WHEN SHE WAS HERE AND I WOULD
COME GIVE HIM HIS DISCHARGE PAPERWORK. PT VOICES UNDERSTANDING. NO OTHER NEEDS
WERE VOICED. CALL LIGHT IS WITHIN REACH.

## 2022-12-05 NOTE — NUR
Quiet night- VSS,, voiding well per urinal, no requests, was given tramadol at
start of shift for arthritis pain- will get CBC this morning none